# Patient Record
Sex: FEMALE | Race: WHITE | NOT HISPANIC OR LATINO
[De-identification: names, ages, dates, MRNs, and addresses within clinical notes are randomized per-mention and may not be internally consistent; named-entity substitution may affect disease eponyms.]

---

## 2017-09-22 ENCOUNTER — APPOINTMENT (OUTPATIENT)
Dept: OBGYN | Facility: CLINIC | Age: 27
End: 2017-09-22
Payer: COMMERCIAL

## 2017-09-22 PROCEDURE — 99395 PREV VISIT EST AGE 18-39: CPT

## 2017-11-30 ENCOUNTER — APPOINTMENT (OUTPATIENT)
Dept: SURGICAL ONCOLOGY | Facility: CLINIC | Age: 27
End: 2017-11-30
Payer: COMMERCIAL

## 2017-11-30 VITALS
HEIGHT: 63 IN | SYSTOLIC BLOOD PRESSURE: 127 MMHG | BODY MASS INDEX: 31.01 KG/M2 | DIASTOLIC BLOOD PRESSURE: 88 MMHG | WEIGHT: 175 LBS | OXYGEN SATURATION: 100 % | HEART RATE: 91 BPM | RESPIRATION RATE: 16 BRPM

## 2017-11-30 DIAGNOSIS — Z85.9 PERSONAL HISTORY OF MALIGNANT NEOPLASM, UNSPECIFIED: ICD-10-CM

## 2017-11-30 DIAGNOSIS — Q83.8 OTHER CONGENITAL MALFORMATIONS OF BREAST: ICD-10-CM

## 2017-11-30 DIAGNOSIS — Z78.9 OTHER SPECIFIED HEALTH STATUS: ICD-10-CM

## 2017-11-30 DIAGNOSIS — G43.909 MIGRAINE, UNSPECIFIED, NOT INTRACTABLE, W/OUT STATUS MIGRAINOSUS: ICD-10-CM

## 2017-11-30 PROCEDURE — 99204 OFFICE O/P NEW MOD 45 MIN: CPT

## 2017-11-30 RX ORDER — DESOGESTREL AND ETHINYL ESTRADIOL 0.15-0.03
0.15-3 KIT ORAL
Qty: 56 | Refills: 0 | Status: ACTIVE | COMMUNITY
Start: 2017-09-22

## 2017-11-30 RX ORDER — RIZATRIPTAN BENZOATE 10 MG/1
10 TABLET ORAL
Refills: 0 | Status: ACTIVE | COMMUNITY

## 2017-11-30 RX ORDER — LEVOTHYROXINE SODIUM 50 UG/1
50 TABLET ORAL
Qty: 90 | Refills: 0 | Status: ACTIVE | COMMUNITY
Start: 2017-09-15

## 2018-09-25 ENCOUNTER — APPOINTMENT (OUTPATIENT)
Dept: OBGYN | Facility: CLINIC | Age: 28
End: 2018-09-25
Payer: COMMERCIAL

## 2018-09-25 PROCEDURE — 99395 PREV VISIT EST AGE 18-39: CPT

## 2018-11-12 ENCOUNTER — APPOINTMENT (OUTPATIENT)
Dept: SURGICAL ONCOLOGY | Facility: CLINIC | Age: 28
End: 2018-11-12

## 2018-11-20 ENCOUNTER — APPOINTMENT (OUTPATIENT)
Dept: PSYCHIATRY | Facility: CLINIC | Age: 28
End: 2018-11-20
Payer: COMMERCIAL

## 2018-11-20 PROCEDURE — 90792 PSYCH DIAG EVAL W/MED SRVCS: CPT

## 2018-11-28 ENCOUNTER — APPOINTMENT (OUTPATIENT)
Dept: PSYCHIATRY | Facility: CLINIC | Age: 28
End: 2018-11-28

## 2018-12-13 ENCOUNTER — APPOINTMENT (OUTPATIENT)
Dept: OBGYN | Facility: CLINIC | Age: 28
End: 2018-12-13

## 2020-01-23 ENCOUNTER — APPOINTMENT (OUTPATIENT)
Dept: OBGYN | Facility: CLINIC | Age: 30
End: 2020-01-23
Payer: COMMERCIAL

## 2020-01-23 PROCEDURE — 99395 PREV VISIT EST AGE 18-39: CPT

## 2021-01-28 ENCOUNTER — APPOINTMENT (OUTPATIENT)
Dept: OBGYN | Facility: CLINIC | Age: 31
End: 2021-01-28

## 2021-02-07 ENCOUNTER — FORM ENCOUNTER (OUTPATIENT)
Age: 31
End: 2021-02-07

## 2021-02-08 ENCOUNTER — FORM ENCOUNTER (OUTPATIENT)
Age: 31
End: 2021-02-08

## 2021-02-08 ENCOUNTER — APPOINTMENT (OUTPATIENT)
Dept: OBGYN | Facility: CLINIC | Age: 31
End: 2021-02-08
Payer: COMMERCIAL

## 2021-02-08 VITALS
HEIGHT: 63 IN | BODY MASS INDEX: 29.06 KG/M2 | SYSTOLIC BLOOD PRESSURE: 98 MMHG | WEIGHT: 164 LBS | DIASTOLIC BLOOD PRESSURE: 66 MMHG

## 2021-02-08 PROCEDURE — 99072 ADDL SUPL MATRL&STAF TM PHE: CPT

## 2021-02-08 PROCEDURE — 99395 PREV VISIT EST AGE 18-39: CPT

## 2021-05-27 ENCOUNTER — FORM ENCOUNTER (OUTPATIENT)
Age: 31
End: 2021-05-27

## 2021-08-29 ENCOUNTER — FORM ENCOUNTER (OUTPATIENT)
Age: 31
End: 2021-08-29

## 2021-08-30 ENCOUNTER — APPOINTMENT (OUTPATIENT)
Dept: OBGYN | Facility: CLINIC | Age: 31
End: 2021-08-30

## 2021-10-10 ENCOUNTER — FORM ENCOUNTER (OUTPATIENT)
Age: 31
End: 2021-10-10

## 2021-10-27 ENCOUNTER — NON-APPOINTMENT (OUTPATIENT)
Age: 31
End: 2021-10-27

## 2021-10-27 DIAGNOSIS — Z83.49 FAMILY HISTORY OF OTHER ENDOCRINE, NUTRITIONAL AND METABOLIC DISEASES: ICD-10-CM

## 2021-10-27 DIAGNOSIS — Z82.49 FAMILY HISTORY OF ISCHEMIC HEART DISEASE AND OTHER DISEASES OF THE CIRCULATORY SYSTEM: ICD-10-CM

## 2021-10-27 DIAGNOSIS — R10.2 PELVIC AND PERINEAL PAIN: ICD-10-CM

## 2021-10-27 DIAGNOSIS — Z86.39 PERSONAL HISTORY OF OTHER ENDOCRINE, NUTRITIONAL AND METABOLIC DISEASE: ICD-10-CM

## 2021-10-27 DIAGNOSIS — Z83.3 FAMILY HISTORY OF DIABETES MELLITUS: ICD-10-CM

## 2021-10-27 DIAGNOSIS — Z86.59 PERSONAL HISTORY OF OTHER MENTAL AND BEHAVIORAL DISORDERS: ICD-10-CM

## 2021-10-27 RX ORDER — ASPIRIN 325 MG/1
TABLET, FILM COATED ORAL
Refills: 0 | Status: ACTIVE | COMMUNITY

## 2021-11-15 RX ORDER — DIAZEPAM 5 MG/1
5 TABLET ORAL ONCE
Qty: 1 | Refills: 0 | Status: ACTIVE | COMMUNITY
Start: 2021-11-15 | End: 1900-01-01

## 2021-11-15 RX ORDER — DIAZEPAM 5 MG/1
5 TABLET ORAL ONCE
Qty: 1 | Refills: 0 | Status: COMPLETED | COMMUNITY
Start: 2021-11-15

## 2021-11-26 ENCOUNTER — APPOINTMENT (OUTPATIENT)
Dept: OBGYN | Facility: CLINIC | Age: 31
End: 2021-11-26
Payer: COMMERCIAL

## 2021-11-26 VITALS
HEIGHT: 63 IN | DIASTOLIC BLOOD PRESSURE: 70 MMHG | WEIGHT: 167 LBS | BODY MASS INDEX: 29.59 KG/M2 | SYSTOLIC BLOOD PRESSURE: 110 MMHG

## 2021-11-26 DIAGNOSIS — Z11.51 ENCOUNTER FOR SCREENING FOR HUMAN PAPILLOMAVIRUS (HPV): ICD-10-CM

## 2021-11-26 DIAGNOSIS — Z12.4 ENCOUNTER FOR SCREENING FOR MALIGNANT NEOPLASM OF CERVIX: ICD-10-CM

## 2021-11-26 DIAGNOSIS — F52.5 VAGINISMUS NOT DUE TO A SUBSTANCE OR KNOWN PHYSIOLOGICAL CONDITION: ICD-10-CM

## 2021-11-26 PROCEDURE — 99213 OFFICE O/P EST LOW 20 MIN: CPT

## 2021-11-27 NOTE — PHYSICAL EXAM
[Alert] : alert [No Lymphadenopathy] : no lymphadenopathy [Soft] : soft [Non-tender] : non-tender [Non-distended] : non-distended [No Lesions] : no lesions [No Mass] : no mass [Flat Affect] : flat affect [FreeTextEntry2] : anxious [Tenderness] : tenderness [Normal] : normal [Declined] : Patient declined rectal exam [FreeTextEntry5] : unable to visualize cervix due to pt discomfort; unable to tolerate exam; repeated multiple times by pt rquest; peds spec used in exam.

## 2021-11-27 NOTE — HISTORY OF PRESENT ILLNESS
[FreeTextEntry1] : 31 year old female  female presents for Pap smear. Was here in july for routine exam; was unable to tolerate pap smear and spec exam at that time and wanted to come back at a later time. Hx of severe anxiety and vaginismus. \par \par Not currently sexually active and no other complaints. Never had pap-mother would like for her to have one done. \par \par Pt on daily Klonopin for anxiety.

## 2021-11-27 NOTE — REASON FOR VISIT
[Follow-Up] : a follow-up evaluation of [Parent] : parent [Other: _____] : [unfilled] [FreeTextEntry2] : pap smear
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
## 2021-11-27 NOTE — PROCEDURE
[Cervical Pap Smear] : cervical Pap smear [Liquid Base] : liquid base [de-identified] : did not tolerate well due to vaginismus

## 2021-11-27 NOTE — END OF VISIT
[FreeTextEntry3] : I, Mina Mickey, acted solely as a scribe for Crystal William NP, on 11/26/2021.\par \par All medical record entries made by the scribe were at my, Crystal William NP, direction and personally dictated by me on 11/26/2021. I have reviewed the chart and agree that the record accurately reflects my personal performance of the history, physical exam, assessment and plan. I have also personally directed, reviewed, and agreed with the chart.

## 2021-11-27 NOTE — PLAN
[FreeTextEntry1] : safe sexual practices reviewed with pt; not currently active but would like to be soon\par rx for Enskyce ocp filled. \par recc use of vaginal dilators and pelvic floor PT for vaginismus; continue to see therapist/meds for anxiety r/t intercourse and vaginal procedures. \par pap/hpv attempted today; aware may not be sufficient\par d/w pt and mother that is not sexually active; low risk of cervical CA. \par \par During this visit 20 minutes was spent face-to-face with greater than 50% of the time dedicated to counseling.\par

## 2021-11-29 LAB — HPV HIGH+LOW RISK DNA PNL CVX: NOT DETECTED

## 2021-12-02 LAB — CYTOLOGY CVX/VAG DOC THIN PREP: NORMAL

## 2021-12-19 ENCOUNTER — TRANSCRIPTION ENCOUNTER (OUTPATIENT)
Age: 31
End: 2021-12-19

## 2022-10-10 ENCOUNTER — OUTPATIENT (OUTPATIENT)
Dept: OUTPATIENT SERVICES | Facility: HOSPITAL | Age: 32
LOS: 1 days | Discharge: HOME | End: 2022-10-10

## 2022-10-10 PROCEDURE — 90839 PSYTX CRISIS INITIAL 60 MIN: CPT | Mod: 95

## 2022-10-20 DIAGNOSIS — F60.3 BORDERLINE PERSONALITY DISORDER: ICD-10-CM

## 2022-10-20 DIAGNOSIS — F41.9 ANXIETY DISORDER, UNSPECIFIED: ICD-10-CM

## 2022-10-20 DIAGNOSIS — F32.A DEPRESSION, UNSPECIFIED: ICD-10-CM

## 2023-01-12 ENCOUNTER — APPOINTMENT (OUTPATIENT)
Dept: OBGYN | Facility: CLINIC | Age: 33
End: 2023-01-12

## 2023-01-29 ENCOUNTER — RX RENEWAL (OUTPATIENT)
Age: 33
End: 2023-01-29

## 2023-02-14 ENCOUNTER — APPOINTMENT (OUTPATIENT)
Dept: OBGYN | Facility: CLINIC | Age: 33
End: 2023-02-14
Payer: COMMERCIAL

## 2023-02-14 VITALS
DIASTOLIC BLOOD PRESSURE: 83 MMHG | BODY MASS INDEX: 28.88 KG/M2 | HEIGHT: 63 IN | SYSTOLIC BLOOD PRESSURE: 127 MMHG | WEIGHT: 163 LBS

## 2023-02-14 DIAGNOSIS — G43.909 MIGRAINE, UNSPECIFIED, NOT INTRACTABLE, W/OUT STATUS MIGRAINOSUS: ICD-10-CM

## 2023-02-14 DIAGNOSIS — N94.2 VAGINISMUS: ICD-10-CM

## 2023-02-14 DIAGNOSIS — Z30.41 ENCOUNTER FOR SURVEILLANCE OF CONTRACEPTIVE PILLS: ICD-10-CM

## 2023-02-14 DIAGNOSIS — N89.8 OTHER SPECIFIED NONINFLAMMATORY DISORDERS OF VAGINA: ICD-10-CM

## 2023-02-14 PROCEDURE — 99395 PREV VISIT EST AGE 18-39: CPT

## 2023-02-14 PROCEDURE — 99214 OFFICE O/P EST MOD 30 MIN: CPT | Mod: 25

## 2023-02-14 RX ORDER — DESOGESTREL AND ETHINYL ESTRADIOL 0.15-0.03
0.15-3 KIT ORAL DAILY
Qty: 1 | Refills: 2 | Status: ACTIVE | COMMUNITY
Start: 2021-11-26 | End: 1900-01-01

## 2023-02-14 NOTE — PLAN
[FreeTextEntry1] : Routine GYN Exam\par -Discussed and reviewed importance of monthly BSE\par -erx Enskyce; Oral contraceptive counseling provided to the patient.  Discussed risks and benefits, including thromboembolic events, especially in the setting of smoking, or in the setting of pre-existing medical conditions that predispose to adverse cardiovascular events.  Benign side effects reviewed as well as risk of unintended pregnancy.   Discussion regarding decreased contraceptive efficacy when taken with certain medications or when dosing schedule is erratic.  They do not protect against STI's. All questions answered.\par -Declines STI testing, importance safe sexual practices discussed\par -Pap/HPV test NOT collected; last pap 11/2021 normal. not currently sexually active, s/p HPV vaccination; rto 1 yr or when she becomes sexually active. Very difficult exam. \par -f/u PCP for recommended HCM, vaccinations and CA screening\par \par Vaginal Odor\par - reviewed hygiene\par -bd affirm today\par \par Interested in Fertility Preservation\par -referred to [facility]\par \par Hx Migraines\par -referred to [facility]\par \par Vaginismus\par -referred to pelvic floor PT\par \par RTO 1 yr or sooner as needed. \par During this visit 40 minutes were spent face-to-face with greater than 50% of the time dedicated to counseling.\par \par \par \par \par \par
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
## 2023-02-14 NOTE — REVIEW OF SYSTEMS
[Patient Intake Form Reviewed] : Patient intake form was reviewed [Anxiety] : anxiety [Depression] : depression [Negative] : Heme/Lymph

## 2023-02-15 LAB
CANDIDA VAG CYTO: NOT DETECTED
G VAGINALIS+PREV SP MTYP VAG QL MICRO: NOT DETECTED
T VAGINALIS VAG QL WET PREP: NOT DETECTED

## 2023-07-05 ENCOUNTER — APPOINTMENT (OUTPATIENT)
Dept: HUMAN REPRODUCTION | Facility: CLINIC | Age: 33
End: 2023-07-05
Payer: COMMERCIAL

## 2023-07-05 PROCEDURE — 76830 TRANSVAGINAL US NON-OB: CPT

## 2023-07-05 PROCEDURE — 36415 COLL VENOUS BLD VENIPUNCTURE: CPT

## 2023-07-05 PROCEDURE — 99205 OFFICE O/P NEW HI 60 MIN: CPT | Mod: 25

## 2023-07-13 ENCOUNTER — APPOINTMENT (OUTPATIENT)
Dept: PAIN MANAGEMENT | Facility: CLINIC | Age: 33
End: 2023-07-13

## 2023-07-25 ENCOUNTER — APPOINTMENT (OUTPATIENT)
Dept: HUMAN REPRODUCTION | Facility: CLINIC | Age: 33
End: 2023-07-25
Payer: COMMERCIAL

## 2023-07-25 PROCEDURE — 99215 OFFICE O/P EST HI 40 MIN: CPT

## 2023-09-25 ENCOUNTER — APPOINTMENT (OUTPATIENT)
Dept: HUMAN REPRODUCTION | Facility: CLINIC | Age: 33
End: 2023-09-25
Payer: COMMERCIAL

## 2023-09-25 PROCEDURE — 36415 COLL VENOUS BLD VENIPUNCTURE: CPT

## 2023-09-25 PROCEDURE — 99213 OFFICE O/P EST LOW 20 MIN: CPT | Mod: 25

## 2023-09-25 PROCEDURE — 76830 TRANSVAGINAL US NON-OB: CPT

## 2023-10-02 ENCOUNTER — APPOINTMENT (OUTPATIENT)
Dept: HUMAN REPRODUCTION | Facility: CLINIC | Age: 33
End: 2023-10-02
Payer: COMMERCIAL

## 2023-10-02 PROCEDURE — 36415 COLL VENOUS BLD VENIPUNCTURE: CPT

## 2023-10-02 PROCEDURE — 76857 US EXAM PELVIC LIMITED: CPT

## 2023-10-02 PROCEDURE — 99213 OFFICE O/P EST LOW 20 MIN: CPT | Mod: 25

## 2023-10-04 ENCOUNTER — APPOINTMENT (OUTPATIENT)
Dept: HUMAN REPRODUCTION | Facility: CLINIC | Age: 33
End: 2023-10-04
Payer: COMMERCIAL

## 2023-10-04 PROCEDURE — 83002 ASSAY OF GONADOTROPIN (LH): CPT | Mod: QW

## 2023-10-04 PROCEDURE — 36415 COLL VENOUS BLD VENIPUNCTURE: CPT

## 2023-10-04 PROCEDURE — 76857 US EXAM PELVIC LIMITED: CPT

## 2023-10-04 PROCEDURE — 84144 ASSAY OF PROGESTERONE: CPT

## 2023-10-04 PROCEDURE — 99213 OFFICE O/P EST LOW 20 MIN: CPT | Mod: 25

## 2023-10-04 PROCEDURE — 82670 ASSAY OF TOTAL ESTRADIOL: CPT

## 2023-10-06 ENCOUNTER — APPOINTMENT (OUTPATIENT)
Dept: HUMAN REPRODUCTION | Facility: CLINIC | Age: 33
End: 2023-10-06
Payer: COMMERCIAL

## 2023-10-06 PROCEDURE — 84144 ASSAY OF PROGESTERONE: CPT

## 2023-10-06 PROCEDURE — 83002 ASSAY OF GONADOTROPIN (LH): CPT | Mod: QW

## 2023-10-06 PROCEDURE — 99213 OFFICE O/P EST LOW 20 MIN: CPT | Mod: 25

## 2023-10-06 PROCEDURE — 36415 COLL VENOUS BLD VENIPUNCTURE: CPT

## 2023-10-06 PROCEDURE — 76857 US EXAM PELVIC LIMITED: CPT

## 2023-10-06 PROCEDURE — 82670 ASSAY OF TOTAL ESTRADIOL: CPT

## 2023-10-09 ENCOUNTER — APPOINTMENT (OUTPATIENT)
Dept: HUMAN REPRODUCTION | Facility: CLINIC | Age: 33
End: 2023-10-09
Payer: COMMERCIAL

## 2023-10-09 PROCEDURE — 76857 US EXAM PELVIC LIMITED: CPT

## 2023-10-09 PROCEDURE — 36415 COLL VENOUS BLD VENIPUNCTURE: CPT

## 2023-10-09 PROCEDURE — 99213 OFFICE O/P EST LOW 20 MIN: CPT | Mod: 25

## 2023-10-11 ENCOUNTER — APPOINTMENT (OUTPATIENT)
Dept: HUMAN REPRODUCTION | Facility: CLINIC | Age: 33
End: 2023-10-11
Payer: COMMERCIAL

## 2023-10-11 PROCEDURE — 76857 US EXAM PELVIC LIMITED: CPT

## 2023-10-11 PROCEDURE — 83002 ASSAY OF GONADOTROPIN (LH): CPT | Mod: QW

## 2023-10-11 PROCEDURE — 36415 COLL VENOUS BLD VENIPUNCTURE: CPT

## 2023-10-11 PROCEDURE — 99213 OFFICE O/P EST LOW 20 MIN: CPT | Mod: 25

## 2023-10-11 PROCEDURE — 84144 ASSAY OF PROGESTERONE: CPT

## 2023-10-11 PROCEDURE — 82670 ASSAY OF TOTAL ESTRADIOL: CPT

## 2023-10-13 ENCOUNTER — APPOINTMENT (OUTPATIENT)
Dept: HUMAN REPRODUCTION | Facility: CLINIC | Age: 33
End: 2023-10-13
Payer: COMMERCIAL

## 2023-10-13 PROCEDURE — 99213 OFFICE O/P EST LOW 20 MIN: CPT | Mod: 25

## 2023-10-13 PROCEDURE — 84144 ASSAY OF PROGESTERONE: CPT

## 2023-10-13 PROCEDURE — 36415 COLL VENOUS BLD VENIPUNCTURE: CPT

## 2023-10-13 PROCEDURE — 76857 US EXAM PELVIC LIMITED: CPT

## 2023-10-13 PROCEDURE — 83002 ASSAY OF GONADOTROPIN (LH): CPT | Mod: QW

## 2023-10-13 PROCEDURE — 82670 ASSAY OF TOTAL ESTRADIOL: CPT

## 2023-10-14 ENCOUNTER — APPOINTMENT (OUTPATIENT)
Dept: HUMAN REPRODUCTION | Facility: CLINIC | Age: 33
End: 2023-10-14
Payer: COMMERCIAL

## 2023-10-14 PROCEDURE — 36415 COLL VENOUS BLD VENIPUNCTURE: CPT

## 2023-10-15 ENCOUNTER — APPOINTMENT (OUTPATIENT)
Dept: HUMAN REPRODUCTION | Facility: CLINIC | Age: 33
End: 2023-10-15
Payer: COMMERCIAL

## 2023-10-15 ENCOUNTER — RESULT REVIEW (OUTPATIENT)
Age: 33
End: 2023-10-15

## 2023-10-15 PROCEDURE — 89346 STORAGE/YEAR OOCYTE(S): CPT

## 2023-10-15 PROCEDURE — 76948 ECHO GUIDE OVA ASPIRATION: CPT

## 2023-10-15 PROCEDURE — 89254 OOCYTE IDENTIFICATION: CPT

## 2023-10-15 PROCEDURE — 58970 RETRIEVAL OF OOCYTE: CPT

## 2023-10-15 PROCEDURE — 89337 CRYOPRESERVATION OOCYTE(S): CPT

## 2023-10-16 ENCOUNTER — APPOINTMENT (OUTPATIENT)
Dept: HUMAN REPRODUCTION | Facility: CLINIC | Age: 33
End: 2023-10-16
Payer: COMMERCIAL

## 2024-04-18 ENCOUNTER — APPOINTMENT (OUTPATIENT)
Dept: OTOLARYNGOLOGY | Facility: CLINIC | Age: 34
End: 2024-04-18
Payer: COMMERCIAL

## 2024-04-18 VITALS
WEIGHT: 175 LBS | BODY MASS INDEX: 31.01 KG/M2 | HEART RATE: 104 BPM | DIASTOLIC BLOOD PRESSURE: 80 MMHG | HEIGHT: 63 IN | SYSTOLIC BLOOD PRESSURE: 166 MMHG

## 2024-04-18 DIAGNOSIS — R09.A2 FOREIGN BODY SENSATION, THROAT: ICD-10-CM

## 2024-04-18 DIAGNOSIS — K14.6 GLOSSODYNIA: ICD-10-CM

## 2024-04-18 PROCEDURE — 99213 OFFICE O/P EST LOW 20 MIN: CPT

## 2024-04-18 RX ORDER — CLONAZEPAM 2 MG/1
TABLET ORAL
Refills: 0 | Status: DISCONTINUED | COMMUNITY
End: 2024-04-18

## 2024-04-18 RX ORDER — TRAZODONE HYDROCHLORIDE 150 MG/1
150 TABLET ORAL
Refills: 0 | Status: ACTIVE | COMMUNITY

## 2024-04-18 RX ORDER — TOPIRAMATE 50 MG/1
50 CAPSULE, EXTENDED RELEASE ORAL
Qty: 90 | Refills: 0 | Status: DISCONTINUED | COMMUNITY
Start: 2017-06-17 | End: 2024-04-18

## 2024-04-18 RX ORDER — LAMOTRIGINE 50 MG/1
50 TABLET, ORALLY DISINTEGRATING ORAL
Refills: 0 | Status: ACTIVE | COMMUNITY

## 2024-04-18 RX ORDER — LISDEXAMFETAMINE DIMESYLATE 10 MG/1
CAPSULE ORAL
Refills: 0 | Status: DISCONTINUED | COMMUNITY
End: 2024-04-18

## 2024-04-18 RX ORDER — FLUOXETINE HYDROCHLORIDE 20 MG/1
20 CAPSULE ORAL DAILY
Refills: 0 | Status: DISCONTINUED | COMMUNITY
End: 2024-04-18
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
## 2024-04-18 NOTE — REASON FOR VISIT
[Subsequent Evaluation] : a subsequent evaluation for [FreeTextEntry2] : tongue/ mouth tingly feeling

## 2024-04-18 NOTE — ASSESSMENT
[FreeTextEntry1] : pt refused fiberoptic laryngosopy symptoms likely to change of meds and/or anxiety

## 2024-05-20 ENCOUNTER — RX RENEWAL (OUTPATIENT)
Age: 34
End: 2024-05-20

## 2024-05-20 ENCOUNTER — APPOINTMENT (OUTPATIENT)
Dept: PHARMACY | Facility: CLINIC | Age: 34
End: 2024-05-20

## 2024-05-20 ENCOUNTER — APPOINTMENT (OUTPATIENT)
Dept: OTOLARYNGOLOGY | Facility: CLINIC | Age: 34
End: 2024-05-20

## 2024-05-20 RX ORDER — DESOGESTREL AND ETHINYL ESTRADIOL 0.15-0.03
0.15-3 KIT ORAL DAILY
Qty: 1 | Refills: 2 | Status: ACTIVE | COMMUNITY
Start: 2021-11-26 | End: 1900-01-01

## 2024-06-04 ENCOUNTER — APPOINTMENT (OUTPATIENT)
Dept: OBGYN | Facility: CLINIC | Age: 34
End: 2024-06-04
Payer: COMMERCIAL

## 2024-06-04 VITALS
SYSTOLIC BLOOD PRESSURE: 96 MMHG | DIASTOLIC BLOOD PRESSURE: 69 MMHG | WEIGHT: 181 LBS | HEIGHT: 63 IN | BODY MASS INDEX: 32.07 KG/M2

## 2024-06-04 DIAGNOSIS — N89.8 OTHER SPECIFIED NONINFLAMMATORY DISORDERS OF VAGINA: ICD-10-CM

## 2024-06-04 DIAGNOSIS — Z78.9 OTHER SPECIFIED HEALTH STATUS: ICD-10-CM

## 2024-06-04 DIAGNOSIS — Z13.31 ENCOUNTER FOR SCREENING FOR DEPRESSION: ICD-10-CM

## 2024-06-04 DIAGNOSIS — Z80.3 FAMILY HISTORY OF MALIGNANT NEOPLASM OF BREAST: ICD-10-CM

## 2024-06-04 PROCEDURE — 99459 PELVIC EXAMINATION: CPT

## 2024-06-04 PROCEDURE — 99212 OFFICE O/P EST SF 10 MIN: CPT | Mod: 25

## 2024-06-04 PROCEDURE — 99395 PREV VISIT EST AGE 18-39: CPT

## 2024-06-04 NOTE — REVIEW OF SYSTEMS
[Patient Intake Form Reviewed] : Patient intake form was reviewed [Anxiety] : anxiety [Negative] : Heme/Lymph [FreeTextEntry8] : odor and discharge, chronic

## 2024-06-04 NOTE — HISTORY OF PRESENT ILLNESS
[FreeTextEntry1] : 34 year old female  LMP 24 presents for annual GYN visit. ,Takes continuous ocp (Enskyce) doing well and would like to continue.    . Hx of severe anxiety and vaginismus. s/p pelvic floor PT that she thinks has helped her. Has new boyfriend, not sexually active, declines STI screening. c/o vaginal odor and discharge for years.   s/p egg retrieval for oocyte preservation with [facility]AC[facility] Dr. Burch 10/2023, pap done under sedation at that time.  +Breast CA mother  Never sexually active and no other complaints. Last pap 10/2023 NILM.    Pt on daily Klonopin for anxiety. Also taking Vyvanse, Synthroid, Lamictal, Prozac, Trazadone, Enskyce. Seeing psychiatrist/therapist.  [Patient reported PAP Smear was normal] : Patient reported PAP Smear was normal [PapSmeardate] : 10/2023 [Patient refuses STI testing] : Patient refuses STI testing

## 2024-06-04 NOTE — PLAN
[FreeTextEntry1] : Routine GYN Exam -Discussed and reviewed importance of monthly BSE -Declines STI testing, importance safe sexual practices discussed -Pap up to date 10/2023 NILM. rpt 3 yrs.  -Baseline mammo @ 35 -f/u PCP for recommended HCM, vaccinations and CA screening -aptima done for vaginal discharge, will treat with oral medications as needed 2/2 sever vaginismus -Refilled Enskyce, Oral contraceptive counseling provided to the patient.  Discussed risks and benefits, including thromboembolic events, especially in the setting of smoking, or in the setting of pre-existing medical conditions that predispose to adverse cardiovascular events.  Benign side effects reviewed as well as risk of unintended pregnancy.   Discussion regarding decreased contraceptive efficacy when taken with certain medications or when dosing schedule is erratic.  They do not protect against STI's. All questions answered.  rto 1 yr or sooner as needed.

## 2024-06-04 NOTE — PHYSICAL EXAM
[Chaperone Present] : A chaperone was present in the examining room during all aspects of the physical examination [[id number]] : A chaperone was present during the pelvic exam. [FreeTextEntry2] : Clair BARNES [Appropriately responsive] : appropriately responsive [Alert] : alert [No Acute Distress] : no acute distress [No Lymphadenopathy] : no lymphadenopathy [Soft] : soft [Non-tender] : non-tender [Non-distended] : non-distended [No HSM] : No HSM [No Lesions] : no lesions [No Mass] : no mass [Oriented x3] : oriented x3 [Examination Of The Breasts] : a normal appearance [No Masses] : no breast masses were palpable [Labia Majora] : normal [Labia Minora] : normal [Discharge] : a  ~M vaginal discharge was present [Moderate] : moderate [Foul Smelling] : not foul smelling [White] : white [Watery] : watery [Normal] : normal [Uterine Adnexae] : normal [FreeTextEntry5] : unable to visualize, pt declines

## 2024-06-06 LAB
BV BACTERIA RRNA VAG QL NAA+PROBE: NOT DETECTED
C GLABRATA RNA VAG QL NAA+PROBE: NOT DETECTED
C TRACH RRNA SPEC QL NAA+PROBE: NOT DETECTED
CANDIDA RRNA VAG QL PROBE: NOT DETECTED
N GONORRHOEA RRNA SPEC QL NAA+PROBE: NOT DETECTED
T VAGINALIS RRNA SPEC QL NAA+PROBE: NOT DETECTED

## 2024-06-28 ENCOUNTER — APPOINTMENT (OUTPATIENT)
Dept: PHARMACY | Facility: CLINIC | Age: 34
End: 2024-06-28

## 2024-08-29 ENCOUNTER — APPOINTMENT (OUTPATIENT)
Dept: OTOLARYNGOLOGY | Facility: CLINIC | Age: 34
End: 2024-08-29
Payer: COMMERCIAL

## 2024-08-29 VITALS
SYSTOLIC BLOOD PRESSURE: 91 MMHG | HEIGHT: 63 IN | HEART RATE: 90 BPM | BODY MASS INDEX: 30.83 KG/M2 | DIASTOLIC BLOOD PRESSURE: 67 MMHG | WEIGHT: 174 LBS

## 2024-08-29 DIAGNOSIS — H90.71 MIXED CONDUCTIVE AND SENSORINEURAL HEARING LOSS, UNILATERAL, RIGHT EAR, WITH UNRESTRICTED HEARING ON THE CONTRALATERAL SIDE: ICD-10-CM

## 2024-08-29 DIAGNOSIS — H90.41 SENSORINEURAL HEARING LOSS, UNILATERAL, RIGHT EAR, WITH UNRESTRICTED HEARING ON THE CONTRALATERAL SIDE: ICD-10-CM

## 2024-08-29 DIAGNOSIS — H93.19 TINNITUS, UNSPECIFIED EAR: ICD-10-CM

## 2024-08-29 PROCEDURE — 92557 COMPREHENSIVE HEARING TEST: CPT

## 2024-08-29 PROCEDURE — 92550 TYMPANOMETRY & REFLEX THRESH: CPT

## 2024-08-29 PROCEDURE — 99213 OFFICE O/P EST LOW 20 MIN: CPT

## 2024-08-29 RX ORDER — FREMANEZUMAB-VFRM 225 MG/1.5ML
INJECTION SUBCUTANEOUS
Refills: 0 | Status: ACTIVE | COMMUNITY

## 2024-08-29 RX ORDER — TIRZEPATIDE 15 MG/.5ML
INJECTION, SOLUTION SUBCUTANEOUS
Refills: 0 | Status: ACTIVE | COMMUNITY

## 2024-08-29 RX ORDER — CLONAZEPAM 2 MG/1
TABLET ORAL
Refills: 0 | Status: ACTIVE | COMMUNITY

## 2024-08-29 RX ORDER — FLUOXETINE HCL 10 MG
TABLET ORAL
Refills: 0 | Status: ACTIVE | COMMUNITY

## 2024-08-29 NOTE — HISTORY OF PRESENT ILLNESS
[de-identified] : 34 yr old female w know unilat [facility] AD, hasn't had an audio in a long time +tinnitus, maybe on the left -dizzy  +FH mother (bilat) -hx otitis, noise exp, head trauma

## 2024-10-04 ENCOUNTER — APPOINTMENT (OUTPATIENT)
Dept: PHARMACY | Facility: CLINIC | Age: 34
End: 2024-10-04

## 2025-07-07 ENCOUNTER — APPOINTMENT (OUTPATIENT)
Dept: OBGYN | Facility: CLINIC | Age: 35
End: 2025-07-07
Payer: COMMERCIAL

## 2025-07-07 ENCOUNTER — NON-APPOINTMENT (OUTPATIENT)
Age: 35
End: 2025-07-07

## 2025-07-07 VITALS
SYSTOLIC BLOOD PRESSURE: 101 MMHG | WEIGHT: 140 LBS | DIASTOLIC BLOOD PRESSURE: 69 MMHG | BODY MASS INDEX: 24.8 KG/M2 | HEIGHT: 63 IN

## 2025-07-07 PROBLEM — R92.30 DENSE BREAST: Status: ACTIVE | Noted: 2025-07-07

## 2025-07-07 PROBLEM — Z11.3 ROUTINE SCREENING FOR STI (SEXUALLY TRANSMITTED INFECTION): Status: ACTIVE | Noted: 2025-07-07

## 2025-07-07 PROBLEM — Z12.39 ENCOUNTER FOR SCREENING BREAST EXAMINATION: Status: ACTIVE | Noted: 2025-07-07

## 2025-07-07 PROBLEM — Z00.00 WELL WOMAN EXAM (NO GYNECOLOGICAL EXAM): Status: ACTIVE | Noted: 2025-07-07

## 2025-07-07 PROCEDURE — 36415 COLL VENOUS BLD VENIPUNCTURE: CPT

## 2025-07-07 PROCEDURE — 99395 PREV VISIT EST AGE 18-39: CPT

## 2025-07-09 LAB
C TRACH RRNA SPEC QL NAA+PROBE: NOT DETECTED
N GONORRHOEA RRNA SPEC QL NAA+PROBE: NOT DETECTED
SOURCE TP AMPLIFICATION: NORMAL

## 2025-07-11 ENCOUNTER — TRANSCRIPTION ENCOUNTER (OUTPATIENT)
Age: 35
End: 2025-07-11

## 2025-07-11 LAB
CYTOLOGY CVX/VAG DOC THIN PREP: NORMAL
HPV HIGH+LOW RISK DNA PNL CVX: NOT DETECTED

## 2025-07-16 ENCOUNTER — APPOINTMENT (OUTPATIENT)
Dept: ORTHOPEDIC SURGERY | Facility: CLINIC | Age: 35
End: 2025-07-16

## 2025-07-16 ENCOUNTER — TRANSCRIPTION ENCOUNTER (OUTPATIENT)
Age: 35
End: 2025-07-16

## 2025-09-02 ENCOUNTER — TRANSCRIPTION ENCOUNTER (OUTPATIENT)
Age: 35
End: 2025-09-02